# Patient Record
Sex: MALE | Race: WHITE | NOT HISPANIC OR LATINO | Employment: FULL TIME | URBAN - METROPOLITAN AREA
[De-identification: names, ages, dates, MRNs, and addresses within clinical notes are randomized per-mention and may not be internally consistent; named-entity substitution may affect disease eponyms.]

---

## 2023-09-30 ENCOUNTER — DOCUMENTATION (OUTPATIENT)
Dept: ORTHOPEDIC SURGERY | Facility: HOSPITAL | Age: 48
End: 2023-09-30
Payer: COMMERCIAL

## 2023-09-30 DIAGNOSIS — S76.012A HIP STRAIN, LEFT, INITIAL ENCOUNTER: ICD-10-CM

## 2023-09-30 PROBLEM — J20.9 ACUTE BRONCHITIS WITH BRONCHOSPASM: Status: ACTIVE | Noted: 2023-09-30

## 2023-09-30 PROBLEM — Z98.52 STATUS POST VASECTOMY: Status: ACTIVE | Noted: 2023-09-30

## 2023-09-30 PROBLEM — R06.89 DECREASED LUNG SOUNDS: Status: ACTIVE | Noted: 2023-09-30

## 2023-09-30 RX ORDER — MULTIVITAMIN
1 TABLET ORAL DAILY
COMMUNITY
Start: 2021-05-21

## 2023-10-01 NOTE — PROGRESS NOTES
At LOV on 09/29/23 with Dr. Ventura pt was Rx'd PT. Pt is [facility]. Referral entered into Epic as well and sent to Evy Wilkins and Sylvie Oden.     Breonna Stacy LPN

## 2023-10-18 NOTE — PROGRESS NOTES
Pt's MRI was approved by [facility]. Called and LM notifying pt.     Breonna Stacy LPN

## 2023-11-01 ENCOUNTER — EVALUATION (OUTPATIENT)
Dept: PHYSICAL THERAPY | Facility: CLINIC | Age: 48
End: 2023-11-01
Payer: COMMERCIAL

## 2023-11-01 DIAGNOSIS — S76.012S: ICD-10-CM

## 2023-11-01 DIAGNOSIS — M25.559 PAIN IN JOINT, PELVIC REGION AND THIGH: ICD-10-CM

## 2023-11-01 DIAGNOSIS — S76.912S: ICD-10-CM

## 2023-11-01 DIAGNOSIS — M25.552 LEFT HIP PAIN: ICD-10-CM

## 2023-11-01 DIAGNOSIS — S76.012A HIP STRAIN, LEFT, INITIAL ENCOUNTER: Primary | ICD-10-CM

## 2023-11-01 PROCEDURE — 97161 PT EVAL LOW COMPLEX 20 MIN: CPT | Mod: GP

## 2023-11-01 PROCEDURE — 97110 THERAPEUTIC EXERCISES: CPT | Mod: GP

## 2023-11-01 NOTE — PROGRESS NOTES
"Patient Name Omar Paredes  MRN: [id number]  Today's Date: 11/1/2023  Time Calculation  Start Time: 0540  Stop Time: 0620  Time Calculation (min): 40 min    Therapy Diagnoses:   1. Hip strain, left, initial encounter  Referral to Physical Therapy    Follow Up In Physical Therapy      2. Left hip pain        3. Pain in joint, pelvic region and thigh        4. Strain of hip and thigh, left, sequela            General:  Reason for visit: Left hip pain   Referred by: Dr. Lorenzo Pepper MD appt:  11/10/2023  Preferred Name:  Omar  Script:  Evaluate and treat  Onset Date:  8/11/2023    Insurance:   Visit #: 1  Insurance Reviewed  (per information provided by  pre-cert team)   Authorization required:  Y  Approved # of visits:12  Authorization/MCR certification date range:  10/9/2023 to 12/1/2023    Assessment:      Problems:    Pain:  __X_  Posture/Body mechanics deficits:  ___  Decreased knowledge HEP:  _X__  Decreased knowledge of Precautions:  _X__  Activity Limitations:   ___  ADL's/IADL's/Self-care skills:  ___  ROM/Joint Mobility:  _X__  Strength:  __X_  Decreased functional level:   __X_  Flexibility:  _X__  Tenderness/decreased soft tissue mobility:  _X__  Gait/Stairs:  __X_  Fall Risk:  __X_  Balance:  __X_  Edema:  ___  Participation restrictions:  __X_  Sensory:  ___  Transfers:  ___  Decreased knowledge of brace:   ___  Other:  ___    X Indicates included in problem list    Goals:      STG:  In two weeks.   Increased postural awareness.     Compliant with HEP    LTG: by discharge    Increased postural awareness and posture WFL.    Increased function with ADL's and IADL's.  Improve LEFS to:  20 %  limitation of function.    Normal reciprocal pattern on stairs for improved function to all levels of home.      Increase  R SLS to 20\"    Increase L strength to WNL for improved ease of work duties, especially when carrying a ladder    Increase denisa LE flexibility to WFL.      I and compliant with HEP and proper L LE " "care.      Rehab potential to achieve the above goals is good.    Patient is aware of diagnosis and prognosis and agrees with established plan of care and goals.    Plan:   Skilled PT 2 x/week for 6 weeks( Until goals achieved, maximum rehab potential has been achieved, or patient has plateaued)  for:    Aquatics  _____  CP   __X__  Dry needling  ____  Education  __X__  Electrical Stimulation  __X__  Gait training  ____  HEP  __X__  HP  __X__  Manual  __X__  Mechanical Traction  ____  NMR  __X__  Self-care/home management  __X__  Therapeutic Exercise   __X__  Therapeutic Activities  __X__  US  __X__  Work Conditioning  ____  Re-assessment  ____  Other  ____    X Indicates included in treatment plan    PT for Nu-step for functional mobility and soft tissue warm up for more efficient stretching, work on     Subjective:    HPI: Pt works as a  at the [employer] main campus.  He was carrying a ladder towards an area of potential flooding when he slipped on a floor where someone had mopped without a wet floor sign.  He describes his left left sliding forward and all his weight shifted to his left as he grabbed the door frame.    Pain  Left lateral thigh, wraps around to the buttock, and to the anterior thigh 5-8 depending on activity  Type of pain:  Sharp/Nerve pinch intermittent; \"Constant pain tightness\"  What increases pain: Working on ladder at work, sudden shift of his body weight, standing up from a low position, hip external and internal rotation  What decreases pain: Biofreeze, Advil, ice, wife massages the left hip    Medical Hx unremarkable  Precautions: none       Adult Screening Risk:      Fall Risk:  no. Even though patient fell at work, it was due to slipping on a wet surface. Otherwise, he is not at risk for falling    There are no spiritual/cultural practices/values/needs that are important to know     Initial Fall Risk Screening:   Patient has not fallen in the last 6 months. Patient does not " have a fear of falling. They do not need assistance with sitting, standing or walking. Does not need assistance walking in her home. They do not need assistance in an unfamiliar setting. The patient is not using an assistive device.     Domestic Violence Screen: Does not feel threatened or abused physically, emotionally or sexually. Do you feel UNSAFE?   The patient feels safe in the home.     Depression/Suicide Screening:   During the past 2 weeks, the patient has not felt down, depressed or hopeless.    During the past 2 weeks, the patient has not felt little interest or pleasure in doing things.    They do not have a risk of suicide.       Human Trafficking risk:  No    Key Learner:  Self  Preferred Learning:  Printed Materials/Demonstration/Discussion  Learning Barriers:  no    Patient goal:  Alleviate pain  Patient is aware of diagnosis and prognosis.    Living Environment:    Lives with:  spouse    Prior Function:  Independent in all aspects of his life    Function:    A and O x 3  Work: Continues to work with difficulty depending on the activity  Recreation: Likes to exercise  Sitting: not affected Standing: difficulty transition from a low squat or kneel to standing   Walking:   Hurts more when carrying ladder    Objective:    Outcome Measures:  Other Measures  Lower Extremity Funtional Score (LEFS): 50/80     Posture:  decreased WB LE,  LE ER at hip.      Gait/stairs:  decreased stance time, decreased hip ext and trunk rotation, LE ER throughout cycle and step to pattern on stairs with B UE support with LE WB.      Balance:   L SLS:  >15 sec    ROM:    A/PROM with pain in left IR/ER, otherwise WFL  MMT:   Hip flexors: Right 5/5; Left 4/5  ext: Sky 4+/5  abd: Right 5/5; Left 3+/5  add: Right 5/5; Left 4-/5  Quads: Bilaterally 5/5  Hams: Bilaterally 5/5    Flexibility:    Hams:  SLR: Right 50, Left 40:  Hip flexors:  past neutral    Palpation:  NT  Treatment:    Evaluation:  25 minutes    **= HEP  NV= Next  visit  np= not performed  nb= non-billable  G= group  HEP= discharged to HEP.     Therapeutic Exercise:  15  Access Code: [email or web address]  URL: [email or web address]  Date: 11/02/2023  Prepared by: Cari Downing    Exercises  - Supine Hip Adduction Isometric with Ball  - 1-2 x daily - 7 x weekly - 1-2 sets - 10 reps - 5-10 count hold  - Hooklying Clamshell with Resistance  - 1-2 x daily - 7 x weekly - 2-3 sets - 10 reps  - Supine Bridge  - 1-2 x daily - 7 x weekly - 1-2 sets - 10 reps - 3-5 seconds hold  - Hooklying Isometric Hip Flexion  - 1-2 x daily - 7 x weekly - 1-2 sets - 10 reps - 5 count hold  - Supine Hamstring Stretch with Strap  - 1-2 x daily - 7 x weekly - 1 sets - 5 reps - 20 seconds hold  - Prone Hip Extension  - 1-2 x daily - 7 x weekly - 1-2 sets - 10 reps - 3-5 seconds hold    Education:  poc, anatomy, physiology, posture, body mechanics, safety awareness, HEP.  Preferred learning:  pictures, demonstration.  Demonstrated good understanding.

## 2023-11-01 NOTE — Clinical Note
November 2, 2023     Patient: Omar Paredes   YOB: 1975   Date of Visit: 11/1/2023       To Whom It May Concern:    It is my medical opinion that Omar Paredes {Work release (duty restriction):34384}.    If you have any questions or concerns, please don't hesitate to call.         Sincerely,        Cari Downing, PT    CC: No Recipients

## 2023-11-01 NOTE — Clinical Note
November 2, 2023     Patient: Omar Paredes   YOB: 1975   Date of Visit: 11/1/2023       To Whom it May Concern:    Omar Paredes was seen in my clinic on 11/1/2023. He {Return to school/sport:55362}.    If you have any questions or concerns, please don't hesitate to call.         Sincerely,          Cari Downing, PT        CC: No Recipients

## 2023-11-07 ENCOUNTER — TELEPHONE (OUTPATIENT)
Dept: ORTHOPEDIC SURGERY | Facility: HOSPITAL | Age: 48
End: 2023-11-07
Payer: COMMERCIAL

## 2023-11-08 ENCOUNTER — APPOINTMENT (OUTPATIENT)
Dept: PHYSICAL THERAPY | Facility: CLINIC | Age: 48
End: 2023-11-08
Payer: COMMERCIAL

## 2023-11-08 ENCOUNTER — HOSPITAL ENCOUNTER (OUTPATIENT)
Dept: RADIOLOGY | Facility: HOSPITAL | Age: 48
Discharge: HOME | End: 2023-11-08
Payer: COMMERCIAL

## 2023-11-08 DIAGNOSIS — M25.559 PAIN IN UNSPECIFIED HIP: ICD-10-CM

## 2023-11-08 DIAGNOSIS — S76.012A STRAIN OF MUSCLE, FASCIA AND TENDON OF LEFT HIP, INITIAL ENCOUNTER: ICD-10-CM

## 2023-11-08 PROCEDURE — 73718 MRI LOWER EXTREMITY W/O DYE: CPT | Mod: LT

## 2023-11-08 PROCEDURE — 73718 MRI LOWER EXTREMITY W/O DYE: CPT | Mod: LEFT SIDE | Performed by: RADIOLOGY

## 2023-11-08 NOTE — TELEPHONE ENCOUNTER
Reached out to Evy Wilkins and Sylvie Oden in  [facility] department and there was an approval scanned in this AM for pt's MRI.     Called and LM on [phone] (home)  for pt to call back.     Breonna Stacy LPN

## 2023-11-08 NOTE — TELEPHONE ENCOUNTER
Pt called to check on status of MRI approval through [facility]. Pt's MRI was denied through [facility] d/t cyst being visible on x-ray. Should I still notify the pt to continue with his MRI and go through his insurance?     Breonna Stacy LPN

## 2023-11-10 ENCOUNTER — PHARMACY VISIT (OUTPATIENT)
Dept: PHARMACY | Facility: CLINIC | Age: 48
End: 2023-11-10
Payer: COMMERCIAL

## 2023-11-10 ENCOUNTER — OFFICE VISIT (OUTPATIENT)
Dept: ORTHOPEDIC SURGERY | Facility: HOSPITAL | Age: 48
End: 2023-11-10
Payer: COMMERCIAL

## 2023-11-10 VITALS — WEIGHT: 182 LBS | HEIGHT: 67 IN | BODY MASS INDEX: 28.56 KG/M2

## 2023-11-10 DIAGNOSIS — M13.852 OTHER SPECIFIED ARTHRITIS, LEFT HIP: ICD-10-CM
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
## 2023-11-10 DIAGNOSIS — S76.012A HIP STRAIN, LEFT, INITIAL ENCOUNTER: Primary | ICD-10-CM

## 2023-11-10 PROBLEM — S60.426A: Status: RESOLVED | Noted: 2023-09-23 | Resolved: 2023-11-10

## 2023-11-10 PROBLEM — R06.89 DECREASED LUNG SOUNDS: Status: RESOLVED | Noted: 2023-09-30 | Resolved: 2023-11-10

## 2023-11-10 PROBLEM — J20.9 ACUTE BRONCHITIS WITH BRONCHOSPASM: Status: RESOLVED | Noted: 2023-09-30 | Resolved: 2023-11-10

## 2023-11-10 PROCEDURE — 99214 OFFICE O/P EST MOD 30 MIN: CPT | Performed by: ORTHOPAEDIC SURGERY

## 2023-11-10 PROCEDURE — RXMED WILLOW AMBULATORY MEDICATION CHARGE

## 2023-11-10 RX ORDER — MELOXICAM 15 MG/1
15 TABLET ORAL DAILY PRN
Qty: 30 TABLET | Refills: 0 | Status: SHIPPED | OUTPATIENT
Start: 2023-11-10 | End: 2023-12-10

## 2023-11-10 NOTE — PROGRESS NOTES
47-year-old male presenting for follow-up regarding his left hip pain after work-related incident.  He had a possibly degenerative cyst on his x-ray so an MRI was ordered to evaluate and also intra-articular pathologies.  His symptoms are essentially the same.  Has been taking Advil off and on with some minimal relief when he does take it.  He has been doing physical therapy as well.    The patient does not endorse fevers and chills. ~He/She~ does not endorse any change in her vision or hearing. ~He/She~ does not endorse chest pain, shortness of breath. ~He/She~ does not endorse any abdominal discomfort. ~He/She~ does not endorse any skin irritation or lesions. ~He/She~ does not endorse any new numbness and tingling or as otherwise stated in the history of present illness.    ~He/She~ is in no acute distress, alert and oriented x 3.    Mood and affect are appropriate.    Respirations are unlabored.    Distal limb is pink and well perfused.    Left lower extremity evaluation demonstrates he internally rotates to about neutral to 5 degrees only.  He has a positive impingement sign.  He has a mildly antalgic gait on the left.    MRI was personally reviewed and demonstrates moderate degenerative changes to the left hip and some trochanteric bursitis with abductor tendinosis.    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  He likely had a quiescent arthritic changes radiographically prior to his accident and now is having symptoms related to it.  I would like to send him for an intra-articular cortisone injection and prescribed him oral meloxicam to treat this and hopefully get him back to a stable state.  We will continue to try conservative measures and avoid surgery at this time.  He can follow-up as needed.    Natural History reviewed. All questions answered. ~He/She~ was in agreement with the plan.      **This note was created using voice recognition software and was not  corrected for typographical or grammatical errors.**

## 2023-11-14 ENCOUNTER — TREATMENT (OUTPATIENT)
Dept: PHYSICAL THERAPY | Facility: CLINIC | Age: 48
End: 2023-11-14
Payer: COMMERCIAL

## 2023-11-14 DIAGNOSIS — S76.012A HIP STRAIN, LEFT, INITIAL ENCOUNTER: Primary | ICD-10-CM

## 2023-11-14 DIAGNOSIS — S76.912S: ICD-10-CM

## 2023-11-14 DIAGNOSIS — M25.552 LEFT HIP PAIN: ICD-10-CM

## 2023-11-14 DIAGNOSIS — S76.012S: ICD-10-CM

## 2023-11-14 DIAGNOSIS — M25.559 PAIN IN JOINT, PELVIC REGION AND THIGH: ICD-10-CM

## 2023-11-14 PROCEDURE — 97110 THERAPEUTIC EXERCISES: CPT | Mod: GP,CQ

## 2023-11-14 PROCEDURE — 97035 APP MDLTY 1+ULTRASOUND EA 15: CPT | Mod: GP,CQ

## 2023-11-14 NOTE — PROGRESS NOTES
Physical Therapy  Physical Therapy Progress Note    Patient Name Omar Paredes   MRN: [id number]  Today's Date: 11/14/23  Time Calculation  Start Time: 0400  Stop Time: 0445  Time Calculation (min): 45 min    Insurance:    Visit #: 2  Insurance Reviewed  (per information provided by  pre-cert team)   Authorization required:  Y  Approved # of visits:12  Authorization/MCR certification date range:  10/9/2023 to 12/1/2023     Therapy Diagnoses:   1. Hip strain, left, initial encounter        2. Left hip pain        3. Pain in joint, pelvic region and thigh        4. Strain of hip and thigh, left, sequela            General:  General:  Reason for visit: Left hip pain   Referred by: Dr. Lorenzo Pepper MD appt:  11/10/2023  Preferred Name:  Omar  Script:  Evaluate and treat  Onset Date:  8/11/2023  Assessment:  Patient tolerated treatment: well. Addressed pain with modalties adding ultra sound and manual. Patient had good tolerance.  Patient needs continued work on left hip stability /skilled PT for: progression in closed chain to address remaining functional, objective and subjective deficits to allow them to return to prior /optimal level of function with ADLs.  Patient is progressing with goals: yes  Skilled care:  manual  Plan:    Continue with poc as documented in the legacy system.     Continue to progress per poc:   NV add dips    Subjective:   Patient reports:  states the hip is tight in the AM improves with motion    Have you fallen since last visit:  no    Precautions: none    Pain:  5/10  Location/Type of pain:  greater trochanter, ache    HEP compliance/understanding:  yes    Objective:   Objective Measurements:    Function:  makes revolution on bike with light vernon climb and did well   :  Balance: light hand support for SL balance    Strength:fatigued easily with light band and side walks       Treatment:   **= HEP  NV= Next visit  np= not performed  nb= non-billable  G= group HEP= discharged to  HEP      Therapeutic Exercise:     30 minutes  [employer] bike up right  8 min   Step stretches ham/hip flexion and 20sec 2x  SL multi hip 15x light hand support  Side walks yellow 4 sets  Monster walks yellow 3 sets  Manual Therapy:     5 minutes  Side line manual with smart to left hip    Modalities:       Ultrasound   1MHz at 50% 1.5wcm/2 to left hip 10 min      Education:  ex progression in closed chain  HEP Progression:  issued yellow/green and black band ,side walks/multi hip

## 2023-11-15 ENCOUNTER — HOSPITAL ENCOUNTER (OUTPATIENT)
Dept: RADIOLOGY | Facility: HOSPITAL | Age: 48
Discharge: HOME | End: 2023-11-15
Payer: COMMERCIAL

## 2023-11-15 ENCOUNTER — OFFICE VISIT (OUTPATIENT)
Dept: ORTHOPEDIC SURGERY | Facility: HOSPITAL | Age: 48
End: 2023-11-15
Payer: COMMERCIAL

## 2023-11-15 DIAGNOSIS — M25.849 MASS OF JOINT OF FINGER: Primary | ICD-10-CM

## 2023-11-15 DIAGNOSIS — M25.849 MASS OF JOINT OF FINGER: ICD-10-CM

## 2023-11-15 PROCEDURE — 2500000005 HC RX 250 GENERAL PHARMACY W/O HCPCS: Performed by: STUDENT IN AN ORGANIZED HEALTH CARE EDUCATION/TRAINING PROGRAM

## 2023-11-15 PROCEDURE — 73140 X-RAY EXAM OF FINGER(S): CPT | Mod: RT,FY

## 2023-11-15 PROCEDURE — 73140 X-RAY EXAM OF FINGER(S): CPT | Mod: RIGHT SIDE | Performed by: RADIOLOGY

## 2023-11-15 PROCEDURE — 2500000004 HC RX 250 GENERAL PHARMACY W/ HCPCS (ALT 636 FOR OP/ED): Performed by: STUDENT IN AN ORGANIZED HEALTH CARE EDUCATION/TRAINING PROGRAM

## 2023-11-15 PROCEDURE — 99214 OFFICE O/P EST MOD 30 MIN: CPT | Performed by: STUDENT IN AN ORGANIZED HEALTH CARE EDUCATION/TRAINING PROGRAM

## 2023-11-15 PROCEDURE — 20600 DRAIN/INJ JOINT/BURSA W/O US: CPT | Performed by: STUDENT IN AN ORGANIZED HEALTH CARE EDUCATION/TRAINING PROGRAM

## 2023-11-15 PROCEDURE — 73140 X-RAY EXAM OF FINGER(S): CPT | Mod: RT

## 2023-11-15 PROCEDURE — 99214 OFFICE O/P EST MOD 30 MIN: CPT | Mod: 25 | Performed by: STUDENT IN AN ORGANIZED HEALTH CARE EDUCATION/TRAINING PROGRAM

## 2023-11-15 RX ORDER — BETAMETHASONE SODIUM PHOSPHATE AND BETAMETHASONE ACETATE 3; 3 MG/ML; MG/ML
3 INJECTION, SUSPENSION INTRA-ARTICULAR; INTRALESIONAL; INTRAMUSCULAR; SOFT TISSUE
Status: COMPLETED | OUTPATIENT
Start: 2023-11-15 | End: 2023-11-15

## 2023-11-15 RX ORDER — LIDOCAINE HYDROCHLORIDE 10 MG/ML
0.5 INJECTION INFILTRATION; PERINEURAL
Status: COMPLETED | OUTPATIENT
Start: 2023-11-15 | End: 2023-11-15

## 2023-11-15 RX ADMIN — BETAMETHASONE SODIUM PHOSPHATE AND BETAMETHASONE ACETATE 3 MG: 3; 3 INJECTION, SUSPENSION INTRA-ARTICULAR; INTRALESIONAL; INTRAMUSCULAR at 11:05

## 2023-11-15 RX ADMIN — LIDOCAINE HYDROCHLORIDE 0.5 ML: 10 INJECTION, SOLUTION INFILTRATION; PERINEURAL at 11:05

## 2023-11-15 ASSESSMENT — PAIN - FUNCTIONAL ASSESSMENT: PAIN_FUNCTIONAL_ASSESSMENT: 0-10

## 2023-11-15 ASSESSMENT — PAIN SCALES - GENERAL: PAINLEVEL_OUTOF10: 0 - NO PAIN

## 2023-11-15 NOTE — PROGRESS NOTES
History of Present Illness:  The patient presents today for for mass on his right small finger.  He has a mass over his PIP joint dorsally.  He states that he noticed this in August.  He then had it aspirated at the urgent care in September and one of the things that.  He states it was gelatinous.  The mass is returned.  He states that the mass is currently about half the size as it was when prior.  Pain is mild.  He denies numbness and tingling.      He is right-hand dominant and works as a  for [employer].  Past Medical History:   Diagnosis Date    Blister (nonthermal) of right little finger, initial encounter 09/23/2023    Other specified health status     No pertinent past medical history       Medication Documentation Review Audit       Reviewed by Ml Sims MA (Medical Assistant) on 11/15/23 at 0904      Medication Order Taking? Sig Documenting Provider Last Dose Status   meloxicam (Mobic) 15 mg tablet [id number]  Take 1 tablet (15 mg) by mouth once daily as needed for moderate pain (4 - 6). Cedrick Ventura MD  Active   methylPREDNISolone (Medrol Dospak) 4 mg tablets [id number]  TAKE AS DIRECTED PER PACKAGE INSTRUCTIONS Cedrick Ventura MD  Active   multivitamin tablet [id number]  Take 1 tablet by mouth once daily. Historical Provider, MD  Active   orphenadrine (Norflex) 100 mg 12 hr tablet [id number]  TAKE 1 TABLET BY MOUTH TWO TIMES A DAY Candice Taylor PA-C  Active                    Allergies   Allergen Reactions    Codeine Sulfate Unknown       Social History     Socioeconomic History    Marital status: Single     Spouse name: Not on file    Number of children: Not on file    Years of education: Not on file    Highest education level: Not on file   Occupational History    Not on file   Tobacco Use    Smoking status: Unknown    Smokeless tobacco: Not on file   Substance and Sexual Activity    Alcohol use: Not Currently    Drug use: Not Currently    Sexual activity: Not on file    Other Topics Concern    Not on file   Social History Narrative    Not on file     Social Determinants of Health     Financial Resource Strain: Not on file   Food Insecurity: Not on file   Transportation Needs: Not on file   Physical Activity: Not on file   Stress: Not on file   Social Connections: Not on file   Intimate Partner Violence: Not on file   Housing Stability: Not on file       Past Surgical History:   Procedure Laterality Date    OTHER SURGICAL HISTORY  04/12/2021    Cholecystectomy    OTHER SURGICAL HISTORY  04/12/2021    Foot surgery          Review of Systems   GENERAL: Negative for malaise, significant weight loss, fever  MUSCULOSKELETAL: see HPI  NEURO:  Negative     Physical Examination:  side: right wrist/hand: small finger  There is a dorsal soft tissue mass over the PIP of the small finger.  This is approximately 1cm by 5 mm  Non tender to palpation  Pt can make a full composite fist to distal terrazas crease  Pt full fully extend MP and IP joints.  Normal sensation to light touch   Motor exam within normal limits  Radial pulse palpable  Good capillary refill      Additional studies: XR right little finger  3 views of the left small finger taken and reviewed in office today.  PA, Lateral, And Oblique demonstrating no fracture or dislocations. There is mild arthritis of the PIP joint.  There is a large dorsal osteophyte present     Assessment:  Dorsal soft tissue mass over PIP joint of small finger     Plan:  Patient has a dorsal soft tissue mass over the dorsum of his PIP joint of the little finger.  We discussed treatment options which include: Observation, aspiration, and surgical excision.  Patient had a previous aspiration however this mass has returned.  We discussed another attempt at aspiration and injection.  Patient elected to proceed with this.  We discussed that if it worsens and becomes more bothersome that we could remove it.  This is likely a ganglion cyst.  However, this could be a  giant cell tumor.    An aspiration was not performed however no aspirate was obtained.    S Inj/Asp: R small PIP on 11/15/2023 11:05 AM  Indications: joint swelling, diagnostic evaluation and pain  Details: 22 G needle, dorsal approach  Medications: 3 mg betamethasone acet,sod phos 6 mg/mL; 0.5 mL lidocaine 10 mg/mL (1 %)  Aspirate: 0 mL  Outcome: tolerated well, no immediate complications  Consent was given by the patient. Immediately prior to procedure a time out was called to verify the correct patient, procedure, equipment, support staff and site/side marked as required. Patient was prepped and draped in the usual sterile fashion.

## 2023-11-16 ENCOUNTER — TREATMENT (OUTPATIENT)
Dept: PHYSICAL THERAPY | Facility: CLINIC | Age: 48
End: 2023-11-16
Payer: COMMERCIAL

## 2023-11-16 DIAGNOSIS — S76.012S: ICD-10-CM

## 2023-11-16 DIAGNOSIS — S76.012A HIP STRAIN, LEFT, INITIAL ENCOUNTER: ICD-10-CM

## 2023-11-16 DIAGNOSIS — S76.912S: ICD-10-CM

## 2023-11-16 DIAGNOSIS — M25.552 LEFT HIP PAIN: Primary | ICD-10-CM

## 2023-11-16 DIAGNOSIS — M25.559 PAIN IN JOINT, PELVIC REGION AND THIGH: ICD-10-CM

## 2023-11-16 PROCEDURE — 97035 APP MDLTY 1+ULTRASOUND EA 15: CPT | Mod: GP,CQ

## 2023-11-16 PROCEDURE — 97140 MANUAL THERAPY 1/> REGIONS: CPT | Mod: GP,CQ

## 2023-11-16 PROCEDURE — 97110 THERAPEUTIC EXERCISES: CPT | Mod: GP,CQ

## 2023-11-16 NOTE — PROGRESS NOTES
Physical Therapy  Physical Therapy Progress Note    Patient Name Omar Paredes   MRN: [id number]  Today's Date: 11/16/23  Time Calculation  Start Time: 0530  Stop Time: 0615  Time Calculation (min): 45 min    Insurance:    Visit #: 3  Insurance Reviewed  (per information provided by  pre-cert team)   Authorization required:  Y  Approved # of visits:12  Authorization/MCR certification date range:  10/9/2023 to 12/1/2023  Therapy Diagnoses:   1. Left hip pain        2. Hip strain, left, initial encounter  Follow Up In Physical Therapy      3. Pain in joint, pelvic region and thigh        4. Strain of hip and thigh, left, sequela            General:  Reason for visit: Left hip pain   Referred by: Dr. Lorenzo Pepper MD appt:  11/10/2023  Preferred Name:  Omar  Script:  Evaluate and treat  Onset Date:  8/11/2023  Assessment:  Patient tolerated treatment: well with the added strengthening. Reporting some relief with manual. Patient needs continued work on hip stability an ROM/skilled PT for: progression in hip stability and pain relief to address remaining functional, objective and subjective deficits to allow them to return to prior /optimal level of function with ADLs.  Patient is progressing with goals: yes  Skilled care:   manual , ultrasound , ex    Plan:    Continue with poc as documented in the legacy system.     Continue to progress per poc:   NV add dips    Subjective:   Patient reports:  stated the ultrasound and manual was help full.    Have you fallen since last visit:  no    Precautions: none    Pain:  4/10  Location/Type of pain:  greater trochanter    HEP compliance/understanding:  yes    Objective:   Objective Measurements:      Balance: progressed with airex balance with light finger support    Strength: fatigues easily on left limb       Treatment:   **= HEP  NV= Next visit  np= not performed  nb= non-billable  G= group HEP= discharged to [facility]      Therapeutic Exercise:     20 minutes  [employer] bike up  right  8 min   Equipment  Leg press  10# 12x3  Hip abd add 30/20## 15x2    Step stretches ham/hip flexion and 20sec 2x  Airex SL multi hip 15x light hand support  Dips 6'' 10x2  nv  Min split squat 12x2  Side walks yellow 4 sets  hep  Monster walks yellow 3 sets  hep    Manual Therapy:     15 minutes    Side line manual with smart  tool and roller to left hip         modalities:       Ultrasound   1MHz at 50% 1.5wcm/2 to left hip 10 min       Education:  ex progression  HEP Progression:  progression in equipment,

## 2023-11-20 ENCOUNTER — APPOINTMENT (OUTPATIENT)
Dept: ORTHOPEDIC SURGERY | Facility: HOSPITAL | Age: 48
End: 2023-11-20
Payer: COMMERCIAL

## 2023-11-20 ENCOUNTER — TREATMENT (OUTPATIENT)
Dept: PHYSICAL THERAPY | Facility: CLINIC | Age: 48
End: 2023-11-20
Payer: COMMERCIAL

## 2023-11-20 DIAGNOSIS — S76.012S: ICD-10-CM

## 2023-11-20 DIAGNOSIS — S76.912S: ICD-10-CM

## 2023-11-20 DIAGNOSIS — S76.012A HIP STRAIN, LEFT, INITIAL ENCOUNTER: Primary | ICD-10-CM

## 2023-11-20 PROCEDURE — 97110 THERAPEUTIC EXERCISES: CPT | Mod: GP

## 2023-11-20 PROCEDURE — 97140 MANUAL THERAPY 1/> REGIONS: CPT | Mod: GP

## 2023-11-20 NOTE — PROGRESS NOTES
Physical Therapy  Physical Therapy Progress Note    Patient Name Omar Paredes   MRN: [id number]  Today's Date: 11/20/23  Time Calculation  Start Time: 0438  Stop Time: 0525  Time Calculation (min): 47 min    Insurance:    Visit #: 4  Insurance Reviewed  (per information provided by  pre-cert team)   Authorization required:  Y  Approved # of visits:12  Authorization/MCR certification date range:  10/9/2023 to 12/1/2023    Therapy Diagnoses:   1. Hip strain, left, initial encounter  Follow Up In Physical Therapy    Follow Up In Physical Therapy      2. Strain of hip and thigh, left, sequela          General:  Reason for visit: Left hip pain   Referred by: Dr. Lorenzo Pepper MD appt:  11/10/2023  Preferred Name:  Omar  Script:  Evaluate and treat  Onset Date:  8/11/2023    Assessment:  Patient needs continued work on/skilled PT for: pain management, increased left hip strength and mobility to address remaining functional, objective and subjective deficits to allow them to return to optimal and safe level of function with ADLs and job as  at main [employer]  Patient is progressing with goals: yes  Skilled care:  Therapeutic ex, manual treatment    Plan:    Continue to progress per poc:   NV add additional CKC ex for hip stability    Subjective:   Patient reports:  he has been feeling like his symptoms are decreasing until last Saturday when he had to climb ladders for work and he started having increased pain again.    Have you fallen since last visit:  no    Precautions: no fall risk    Pain:  3-4/10  Location/Type of pain:  Left lateral greater trochanter    HEP compliance/understanding:  yes    Objective:   Objective Measurements:    Function:  climbing high ladders but with pain   Seated AROM left hip IR 10 deg and ER to 35  Flexibility:  left hip flexor to approx. 5 deg hip extension    Treatment:   **= HEP  NV= Next visit  np= not performed  nb= non-billable  G= group HEP= discharged to  "HEP      Therapeutic Exercise:     30 Minutes    [employer] bike up right  8 min while subjective taken  Leg press  55# 2 x 10    Step stretches ham/hip flexion and 20sec 2x  Airex SL multi hip 15x light hand support NP  Airex Tandem stance 1' with right and left foot leading  Heel taps 4'' 10x2    Lateral step overs, 4\" step, 20x  Min split squat 15x1    Side walks yellow 4 sets  hep  Monster walks yellow 3 sets  hep    Manual Therapy:     17 minutes (Combined treatment of manual and US)  Hip flexor, ITB, and lateral hip stretching for  9 minutes    Ultrasound          8  Minutes   1MHz at 50% 1.5wcm/2 to left hip 10 min    Education:  Clarified use of heat vs cold with patient      "

## 2023-11-21 ENCOUNTER — TELEPHONE (OUTPATIENT)
Dept: ORTHOPEDIC SURGERY | Facility: CLINIC | Age: 48
End: 2023-11-21

## 2023-11-21 NOTE — TELEPHONE ENCOUNTER
Received call from pt today, he is a [facility] pt. He called to ask if there was someone for Hip injections at [facility] [place] or closer to his house on the [address]. Gave him options of Dr. Hermelindo Jamies, Dr. Cross and that group who is located on that side or even Dr. Cohen, also suggested an injection through [facility] due to him working at [employer]. Pt would like to go forward with getting an injection through [facility] if they are [facility] capable. Sent email to Dr. Browne in [facility] to see if the radiologist are capable of doing a [facility] cortisone injection under fluoro.     Awaiting response    Breonna Stacy LPN

## 2023-11-22 ENCOUNTER — TREATMENT (OUTPATIENT)
Dept: PHYSICAL THERAPY | Facility: CLINIC | Age: 48
End: 2023-11-22
Payer: COMMERCIAL

## 2023-11-22 DIAGNOSIS — M25.552 LEFT HIP PAIN: ICD-10-CM

## 2023-11-22 DIAGNOSIS — S76.912S: ICD-10-CM

## 2023-11-22 DIAGNOSIS — M25.559 PAIN IN JOINT, PELVIC REGION AND THIGH: ICD-10-CM

## 2023-11-22 DIAGNOSIS — S76.012A HIP STRAIN, LEFT, INITIAL ENCOUNTER: Primary | ICD-10-CM

## 2023-11-22 DIAGNOSIS — S76.012S: ICD-10-CM

## 2023-11-22 PROCEDURE — 97110 THERAPEUTIC EXERCISES: CPT | Mod: GP

## 2023-11-22 NOTE — PROGRESS NOTES
"   Physical Therapy  Physical Therapy Progress Note    Patient Name Omar Paredes   MRN: [id number]  Today's Date:11/22/2023  Time Calculation  Start Time: 0335  Stop Time: 0413  Time Calculation (min): 38 min    Insurance:    Visit #: 5  Insurance Reviewed  (per information provided by  pre-cert team)   Authorization required:  Y  Approved # of visits:12  Authorization/MCR certification date range:  10/9/2023 to 12/1/2023    Therapy Diagnoses:   1. Hip strain, left, initial encounter  Follow Up In Physical Therapy      2. Strain of hip and thigh, left, sequela        3. Left hip pain        4. Pain in joint, pelvic region and thigh          General:  Reason for visit: Left hip pain   Referred by: Dr. Lorenzo Pepper MD appt:  11/10/2023  Preferred Name:  Omar  Script:  Evaluate and treat  Onset Date:  8/11/2023    Assessment:  Patient completed exercises in CKC fashion, without exacerbating soreness that he came in with. He was able to progress with ex such as single leg standing with opposite leg squeezing ball to wall,   Patient needs continued work on strengthening in a controlled environment and will be a good candidate to start trial of aquatics next visit.  Patient is progressing with goals: yes  Skilled care: supervised therapeutic ex, electrical stimulation    Plan:    Continue to progress per poc:   NV add aquatics    Subjective:   Patient reports after last therapy session, patient went home and bent over to  his dog's toy and felt a sharp pain to the left hip    Have you fallen since last visit:  no    Precautions: no fall risk    Pain:  5/10  Location/Type of pain:  left lateral hip \"agitated\".     HEP compliance/understanding:  yes    Objective:   Objective Measurements:    Function:  Pt continues to work as a  attempting to avoid ladder work, but not always able to.  Posture: Pt manages split squats to approx 90 deg with good hip and knee alignment.   Palpable tenderness to surrounding " "left greater trochanter   Treatment  **= HEP  NV= Next visit  np= not performed  nb= non-billable  G= group HEP= discharged to [facility]      Therapeutic Exercise:     23  minutes  NuStep, L4 6' Subjective taken  Leg press  55# 2 x 10 NP    Step stretches ham/hip flexion and 20sec 2x  Airex SL multi hip 15x light hand support   SLS with opposite leg ball squeeze into wall for isometric add, 5\" , 10 x, R/L  Heel taps 4'' 10x2    Forward and lateral step overs, 6\" step, 20x  Min split squat 15x1 alternating legs  modalities:     15 min  IFC x 15 min, 9 CV to left greater trochanter region laterally      "

## 2023-11-28 ENCOUNTER — TREATMENT (OUTPATIENT)
Dept: PHYSICAL THERAPY | Facility: CLINIC | Age: 48
End: 2023-11-28
Payer: COMMERCIAL

## 2023-11-28 DIAGNOSIS — M25.559 PAIN IN JOINT, PELVIC REGION AND THIGH: ICD-10-CM

## 2023-11-28 DIAGNOSIS — S76.012S: Primary | ICD-10-CM

## 2023-11-28 DIAGNOSIS — M25.552 LEFT HIP PAIN: ICD-10-CM

## 2023-11-28 DIAGNOSIS — S76.012A HIP STRAIN, LEFT, INITIAL ENCOUNTER: ICD-10-CM

## 2023-11-28 DIAGNOSIS — S76.912S: Primary | ICD-10-CM

## 2023-11-28 PROCEDURE — 97113 AQUATIC THERAPY/EXERCISES: CPT | Mod: GP,CQ

## 2023-11-28 NOTE — PROGRESS NOTES
Physical Therapy  Physical Therapy Progress Note    Patient Name Omar Paredes   MRN: [id number]  Today's Date: 11/28/23  Time Calculation  Start Time: 0530  Stop Time: 0615  Time Calculation (min): 45 min    Insurance:    Visit #: 6  Insurance Reviewed  (per information provided by  pre-cert team)   Authorization required:  Y  Approved # of visits:12  Authorization/MCR certification date range:  10/9/2023 to 12/1/2023  Therapy Diagnoses:   1. Strain of hip and thigh, left, sequela        2. Hip strain, left, initial encounter  Follow Up In Physical Therapy      3. Left hip pain        4. Pain in joint, pelvic region and thigh            General:  Reason for visit: Left hip pain   Referred by: Dr. Lorenzo Pepper MD appt:  11/10/2023  Preferred Name:  Omar  Script:  Evaluate and treat  Onset Date:  8/11/2023     Assessment:  Patient tolerated treatment: well, reported he felt a greater quad workout with the water ex.  Patient needs continued work  progression with hip mobility and strength/skilled PT for: progression in his water program and  to address remaining functional, objective and subjective deficits to allow them to return to prior /optimal level of function with ADLs.  Patient is progressing with goals: yes  Skilled care:  instruction in water program involving the benefit of water ex and bouncy of water that limits the strain at the hip    Plan:         Continue to progress per poc:   NV add stair stretches    Subjective:   Patient reports:  reports he had several fare ups but it is finally calming down . He takes several breaks sitting at work    Have you fallen since last visit:  no    Precautions: no falls    Pain:  2/10  Location/Type of pain:  left hip    HEP compliance/understanding:  yes    Objective:   Objective Measurements:    Function:   walking longer strides in water and good tolerance with continual walking with no increase pain  :  Balance: performed lower ext ex. Against current and no  wall support           Treatment:    T- speed, TM= treadmill, BTW= back to wall,C= current    Aquatics:          45 minutes  T= 4 TM= 5 min forward  T= 2 TM= backward   3 min   BTW  can can  15x  BTW ham kicks 20x  Lunges with hydro tone  row 15x   Bar bell  hip circles 10x each  Bar bell marching  30x  Bar bell squats 15x2    Deep end blue noodle  Cycle 3 min  Scissor/abd 2 min each    Step ups forward/lateral 15x   Step stretch ham/hip flexion 20sec 2x   nv              Education:  progression with POC  HEP Progression:  n/a

## 2023-11-30 ENCOUNTER — TREATMENT (OUTPATIENT)
Dept: PHYSICAL THERAPY | Facility: CLINIC | Age: 48
End: 2023-11-30
Payer: COMMERCIAL

## 2023-11-30 DIAGNOSIS — M25.559 PAIN IN JOINT, PELVIC REGION AND THIGH: ICD-10-CM

## 2023-11-30 DIAGNOSIS — S76.912S: Primary | ICD-10-CM

## 2023-11-30 DIAGNOSIS — S76.012A HIP STRAIN, LEFT, INITIAL ENCOUNTER: ICD-10-CM

## 2023-11-30 DIAGNOSIS — M25.552 LEFT HIP PAIN: ICD-10-CM

## 2023-11-30 DIAGNOSIS — S76.012S: Primary | ICD-10-CM

## 2023-11-30 PROCEDURE — 97113 AQUATIC THERAPY/EXERCISES: CPT | Mod: GP,CQ

## 2023-11-30 NOTE — PROGRESS NOTES
Physical Therapy  Physical Therapy Progress Note    Patient Name Omar Paredes   MRN: [id number]  Today's Date: 11/30/23  Time Calculation  Start Time: 0445  Stop Time: 0530  Time Calculation (min): 45 min    Insurance:    Visit #: 7  Insurance Reviewed  (per information provided by  pre-cert team)   Authorization required:  Y  Approved # of visits:12  Authorization/MCR certification date range:  10/9/2023 to 12/1/2023  Therapy Diagnoses:   1. Strain of hip and thigh, left, sequela        2. Hip strain, left, initial encounter  Follow Up In Physical Therapy      3. Left hip pain        4. Pain in joint, pelvic region and thigh            General:  Reason for visit: Left hip pain   Referred by: Dr. Lorenzo Pepper MD appt:  11/10/2023  Preferred Name:  Omar  Script:  Evaluate and treat  Onset Date:  8/11/2023  Assessment:  Patient tolerated treatment: very well, progressed with lateral walking and resistance of board .demonstrated good tolerance at higher level of ex.  Patient needs continued work on/skilled PT for: progression I hip stability and core to address remaining functional, objective and subjective deficits to allow them to return to prior /optimal level of function with ADLs.  Patient is progressing with goals: yes  Skilled care:  instruction in posture and ex. Performance     Plan:         Continue to progress per poc:   NV add current with all ex if available, piriformis stretch     Subjective:   Patient reports:  states he has felt better in the past couple day s and did not have  to use the ladder as much    Have you fallen since last visit:  no    Precautions: no falls     Pain:  0/10  Location/Type of pain:  left hip    HEP compliance/understanding:  yes    Objective:   Objective Measurements:    Function:   tolerating work duty's better.    Gait :ambulating with longer strides and no hand support.   Balance: required less hand support with ex.      Strength: able to do step ups on second step with  good control    Treatment:   *    Aquatics:          45 minutes      C= 12 T= 6 TM= 5 min forward  C= 12 =T= 4 TM= backward   3 min   C=12 Walking lunges with bar bell on hands   Side stepping with aqua ciser at hip 60sec x2   Forward lunges with aqua ciser 15x2 each leg  BTW  can can  15x  BTW ham kicks 20x   BTW piriformis stretch 20sec 3x each   nv  Bar duque  hip circles 10x each  Bar bell marching  30x  Bar bell squats 15x2  Bar bell ham curls 15x 2      Deep end blue noodle  Cycle 3 min  Scissor/abd 2 min each     Step ups forward/lateral   second step 15x   Step stretch ham/hip flexion 20sec 2x   nv     T  S        Education:  ex progression of POC  HEP Progression:verbal advise with core strengthening on swiss ball

## 2023-12-04 ENCOUNTER — APPOINTMENT (OUTPATIENT)
Dept: ORTHOPEDIC SURGERY | Facility: HOSPITAL | Age: 48
End: 2023-12-04
Payer: COMMERCIAL

## 2023-12-06 ENCOUNTER — TREATMENT (OUTPATIENT)
Dept: PHYSICAL THERAPY | Facility: CLINIC | Age: 48
End: 2023-12-06
Payer: COMMERCIAL

## 2023-12-06 DIAGNOSIS — S76.012A HIP STRAIN, LEFT, INITIAL ENCOUNTER: ICD-10-CM

## 2023-12-06 PROCEDURE — 97113 AQUATIC THERAPY/EXERCISES: CPT | Mod: GP

## 2023-12-06 NOTE — PROGRESS NOTES
"   Physical Therapy  Physical Therapy Progress Note    Patient Name Omar Paredes   MRN: [id number]  Today's Date: 12/06/23  Time Calculation  Start Time: 0445  Stop Time: 0535  Time Calculation (min): 50 min    Insurance:    Visit #: 8  Insurance Reviewed  (per information provided by  pre-cert team)   Authorization required:  Y  Approved # of visits:12  Authorization/MCR certification date range:  10/9/2023 to 12/1/2023    Therapy Diagnoses:   1. Hip strain, left, initial encounter  Follow Up In Physical Therapy        General:  Reason for visit: Left hip pain   Referred by: Dr. Lorenzo Pepper MD appt:  11/10/2023  Preferred Name:  Omar  Script:  Evaluate and treat  Onset Date:  8/11/2023    Assessment:  Patient tolerated treatment well, did well with progression this date.    Patient needs continued skilled PT for: LE strength and flexibility and core stability to address remaining functional, objective and subjective deficits to allow them to return to prior level of function at work  Patient is progressing with goals: yes: decreased pain, increased ability to do CKC ex in pool  Skilled care:  Supervised aquatics    Plan:      NV add hip rotational movement    Subjective:   Patient reports:  he has noted a big improvement since starting PT in the pool.     Have you fallen since last visit:  no    Precautions: no fall risk    Pain:  2/10 Left hip, \"He knows it's there\"  Location/Type of pain:  dull, left lateral hip    HEP compliance/understanding:  yes    Objective:   Function:   Pt is working without significant difficulty as it is slower. Pt states he has been avoiding elliptical and other weightbearing ex on land which seems to help keep his pain down  Posture: Good posture control in water with dynamic walking ex    Treatment:   **= HEP  NV= Next visit  np= not performed  nb= non-billable  G= group HEP= discharged to [facility]      Aquatics:          50 minutes  C= 12 T= 8 TM= 5 min forward  C= 12 =T= 8 TM= " backward   3 min   C=12 Walking lunges with bar bell on hands 5 laps  Side stepping with aqua ciser at hip 6 laps  Forward lunges with aqua ciser 15x2 each leg  Hydrotone walking, 6 laps  BTW  can can  15x  BTW ham kicks 20x   BTW piriformis stretch 20sec 3x each   nv  Bar duque  hip circles 10x each  Bar bell marching  30x  Bar bell squats 15x2  Bar bell ham curls 15x 2      Deep end blue noodle  Cycle 3 min  Scissor/abd 3min each  Hanging, 5 minutes     Step ups forward/lateral   second step 15x   Step stretch ham/hip flexion 20sec 2x   nv

## 2023-12-06 NOTE — TELEPHONE ENCOUNTER
Forwarding message to [facility] dept to inquire if injections from [facility] could be completed.

## 2023-12-12 ENCOUNTER — TREATMENT (OUTPATIENT)
Dept: PHYSICAL THERAPY | Facility: CLINIC | Age: 48
End: 2023-12-12
Payer: COMMERCIAL

## 2023-12-12 DIAGNOSIS — S76.012A HIP STRAIN, LEFT, INITIAL ENCOUNTER: Primary | ICD-10-CM

## 2023-12-12 PROCEDURE — 97113 AQUATIC THERAPY/EXERCISES: CPT | Mod: GP,CQ

## 2023-12-12 NOTE — PROGRESS NOTES
Physical Therapy  Physical Therapy Progress Note    Patient Name Omar Paredes   MRN: [id number]  Today's Date: 12/12/23  Time Calculation  Start Time: 0440  Stop Time: 0520  Time Calculation (min): 40 min    Insurance:    Visit #: 9  Insurance Reviewed  (per information provided by  pre-cert team)   Authorization required:  Y  Approved # of visits:12  Authorization/MCR certification date range:  10/9/2023 to 12/1/2023    Therapy Diagnoses:   1. Hip strain, left, initial encounter            General:  Reason for visit: Left hip pain   Referred by: Dr. Lorenzo Pepper MD appt:  11/10/2023  Preferred Name:  Omar  Script:  Evaluate and treat  Onset Date:  8/11/2023    Assessment:  Patient tolerated treatment well, did well with progression this date.    Patient needs continued skilled PT for: LE strength and flexibility and core stability to address remaining functional, objective and subjective deficits to allow them to return to prior level of function at work  Patient is progressing with goals: yes: performed aquatic exercise progression with improved hip mobility and stabilization without increase in pain.   Skilled care:  aquatic exercise progression, patient education    Plan:    Continue per POC.  NV progress with lateral and diagonal lunges per tolerance.     Subjective:   Patient reports continued left hip soreness that increases with activity.     Have you fallen since last visit:  no    Precautions: no fall risk    Pain:  3-4/10 Left hip,   Location/Type of pain:  dull, left lateral hip    HEP compliance/understanding:  yes    Objective:   Function:   Pt is working without significant difficulty as it is slower. Pt states he has been avoiding elliptical and other weightbearing ex on land which seems to help keep his pain down  Posture: Good posture control in water with dynamic walking ex    Treatment:   **= HEP  NV= Next visit  np= not performed  nb= non-billable  G= group HEP= discharged to  HEP      Aquatics:          40 minutes  C= 12 T= 8 TM= 5 min forward  C= 12 =T= 8 TM= backward   3 min   C=12 Walking lunges with bar bell on hands 5 laps NV  C=12 Side stepping with closed aqua ciser at hip 6 laps  Forward lunges with aqua ciser 15x2 each leg NV  Hydrotone walking, 6 laps NV  BTW  can can  15x  BTW ham kicks 20x   BTW piriformis stretch 20sec 3x each  NV  C=12 Bar bell  hip circles 10x each  C=12 4 Way marches w/ barbell support x 15 each  C=12 Bar bell squats facing C/away from C x 15 each  C=12 Bar bell ham curls 15x 2   C=12 3 way hip AROM w/ barbell and wall support x 10 each  Yoga tree -> Modified Warrior 3 -> Warrior 2 -> Warrior 1 5 breaths x 1 each denisa    Deep end blue noodle  Cycle 3 min  Scissor/abd 3min each  Hanging, 3 minutes    Step ups forward/lateral   second step 15x nv  Step stretch ham/hip flexion 20sec 2x   nv    Patient Education: aquatic exercise progression, hip stabilization

## 2023-12-14 ENCOUNTER — TREATMENT (OUTPATIENT)
Dept: PHYSICAL THERAPY | Facility: CLINIC | Age: 48
End: 2023-12-14
Payer: COMMERCIAL

## 2023-12-14 DIAGNOSIS — S76.012A HIP STRAIN, LEFT, INITIAL ENCOUNTER: Primary | ICD-10-CM

## 2023-12-14 DIAGNOSIS — S76.912S: ICD-10-CM

## 2023-12-14 DIAGNOSIS — M25.552 LEFT HIP PAIN: ICD-10-CM

## 2023-12-14 DIAGNOSIS — M25.559 PAIN IN JOINT, PELVIC REGION AND THIGH: ICD-10-CM

## 2023-12-14 DIAGNOSIS — S76.012S: ICD-10-CM

## 2023-12-14 PROCEDURE — 97113 AQUATIC THERAPY/EXERCISES: CPT | Mod: GP | Performed by: PHYSICAL THERAPIST

## 2023-12-14 NOTE — PROGRESS NOTES
Physical Therapy  Physical Therapy Progress Note    Patient Name Omar Paredes   MRN: [id number]  Today's Date: 12/14/23  Time Calculation  Start Time: 0900  Stop Time: 0945  Time Calculation (min): 45 min    Insurance:    Visit #: 9  Insurance Reviewed  (per information provided by  pre-cert team)   Authorization required:  Y  Approved # of visits:12  Authorization/MCR certification date range:  10/9/2023 to 12/1/2023    Therapy Diagnoses:   1. Hip strain, left, initial encounter        2. Strain of hip and thigh, left, sequela        3. Left hip pain        4. Pain in joint, pelvic region and thigh          General:  Reason for visit: Left hip pain   Referred by: Dr. Lorenzo Pepper MD appt:  11/10/2023  Preferred Name:  Omar  Script:  Evaluate and treat  Onset Date:  8/11/2023     Assessment:  Patient tolerated treatment well, did well with progression this date.    Patient needs continued work on/skilled PT for: LE flexibility and functional and closed chain strength to address remaining functional, objective and subjective deficits to allow them to return to prior /optimal level of function with ADLs.  Patient is progressing with goals: aware of modifications with HEP during flare up  Skilled care:  aquatic exercise progression    Plan:    Continue to progress per poc:   NV add walking lunges with alternated hydrotone punches.      Subjective:   Patient reports:  that he is feeling a little better since flaring up the L hip with lifting and twisting at work on Sunday.      Have you fallen since last visit:  no    Precautions: no fall risk     Pain:  2-3/10  Location/Type of pain:  medial/lateral L hip tightness    HEP compliance/understanding:  yes with modifications due to pain.     Objective:   Objective Measurements:    Function:   working as a , kneeling and getting up and down from the floor a lot.     Treatment:     Aquatics:          45 minutes  C= 12 T= 11 TM= 5 min forward  C= 12 =T= 9 TM=  backward   5 min   C=12 Walking lunges with bar bell on hands 5 laps/np  C=12 Side stepping with closed aqua ciser at hip 6 laps ea. way  Walking lunges with closed aqua ciser x 3 laps  C= 12 Hydrotone punches with 4 way march:  x 15 ea.  walking, 6 laps NV  BTW  can can  10 x  BTW ham kicks 10 x   BTW piriformis stretch 20sec 3x each  NV  C=12  hip circles cw/ccw  15 x each B hands on hips  C=12 Hydrotone pull down with  squats facing C/away from C x 15 each  C=12 ham curls 15x 2 /np  C=12 3 way hip AROM  x 10 each/np  Yoga tree -> Modified Warrior 3 -> Warrior 2 -> Warrior 1 5 breaths x 1 each denisa/np     Deep end blue noodle  Cycle 3 min  Scissor/abd 3min each    Step ups forward/lateral   second step 15x nv  Step stretch ham/hip flexion 20sec 2x   nv    Education:  aquatic exercise progression

## 2023-12-19 ENCOUNTER — TREATMENT (OUTPATIENT)
Dept: PHYSICAL THERAPY | Facility: CLINIC | Age: 48
End: 2023-12-19
Payer: COMMERCIAL

## 2023-12-19 DIAGNOSIS — S76.012A HIP STRAIN, LEFT, INITIAL ENCOUNTER: Primary | ICD-10-CM

## 2023-12-19 DIAGNOSIS — M25.559 PAIN IN JOINT, PELVIC REGION AND THIGH: ICD-10-CM

## 2023-12-19 DIAGNOSIS — S76.012S: ICD-10-CM

## 2023-12-19 DIAGNOSIS — M25.552 LEFT HIP PAIN: ICD-10-CM

## 2023-12-19 DIAGNOSIS — S76.912S: ICD-10-CM

## 2023-12-19 PROCEDURE — 97113 AQUATIC THERAPY/EXERCISES: CPT | Mod: GP,CQ

## 2023-12-19 NOTE — PROGRESS NOTES
Physical Therapy  Physical Therapy Progress Note    Patient Name Omar Paredes   MRN: [id number]  Today's Date: 12/19/23  Time Calculation  Start Time: 0445  Stop Time: 0530  Time Calculation (min): 45 min    Insurance:    Visit #: 10  Insurance Reviewed  (per information provided by  pre-cert team)   Authorization required:  Y  Approved # of visits:12  Authorization/MCR certification date range:  10/9/2023 to 12/1/2023  Therapy Diagnoses:   1. Hip strain, left, initial encounter        2. Strain of hip and thigh, left, sequela        3. Left hip pain        4. Pain in joint, pelvic region and thigh            General:  Reason for visit: Left hip pain   Referred by: Dr. Lorenzo Pepper MD appt:  11/10/2023  Preferred Name:  Omar  Script:  Evaluate and treat  Onset Date:  8/11/2023  Assessment:  Patient tolerated treatment: well , working at higher level of ex.increased current  and core resistance   Patient needs continued work on hip strength and stability /skilled PT for: progression in higher level of balance and strength and  to address remaining functional, objective and subjective deficits to allow them to return to prior /optimal level of function with ADLs.  Patient is progressing with goals: yes, reported greater mobility after ex.  Skilled care:  ex progression     Plan:         Continue to progress per poc:   Check response to progression    Subjective:   Patient reports:  he over did it with shoveling snow and aggravated the hip. Held off getting another injection to see if water ex. Can continue to help    Have you fallen since last visit:  no    Precautions: no falls    Pain:  5/10  Location/Type of pain:   hip left    HEP compliance/understanding:  yes    Objective:   Objective Measurements:    Function:   greater difficulty getting in and out of car second to stiff ness but not as bad as at first.     ROM:  demonstrated good form with piriformis stretch  Strength: progressed in lunges with aqua  ciser as resistance   Flexibility:  reporting less flexibility after shoveling      Treatment:   **= HEP  NV= Next visit  np= not performed  nb= non-billable  G= group HEP= discharged to [facility]      Therapeutic Exercise:       minutes    1 TM= 5 min forward  C= 15=T= 9 TM= backward   5 min   C=15 Side stepping with closed aqua ciser at hip 6 laps ea. way  Walking lunges with closed aqua ciser x 3 laps  C= 15  march:   with ankle wts (puple)   BTW  can can  10 x  BTW ham kicks 10 x   BTW piriformis stretch 20sec 3x each  NV  C=15  hip circles cw/ccw  15 x each B hands on hips  C=15   squats facing with foam under each foot 15x2   C=15 ham curls 15x 2 /np  C=15 3 way hip AROM  x 10 each bar bell row also   Yoga tree -> Modified Warrior 3 -> Warrior 2 -> Warrior 1 5 breaths x 1 each denisa/np        Deep end blue noodle  Cycle 3 min  Scissor/abd 3min each     Step ups forward/lateral   second step 15x nv  Step stretch ham/hip flexion 20sec 2x   nv  S        Education:  ex progression in correct ex. performance  HEP Progression:  n/a

## 2023-12-21 ENCOUNTER — APPOINTMENT (OUTPATIENT)
Dept: PHYSICAL THERAPY | Facility: CLINIC | Age: 48
End: 2023-12-21
Payer: COMMERCIAL

## 2023-12-28 ENCOUNTER — TREATMENT (OUTPATIENT)
Dept: PHYSICAL THERAPY | Facility: CLINIC | Age: 48
End: 2023-12-28
Payer: COMMERCIAL

## 2023-12-28 ENCOUNTER — APPOINTMENT (OUTPATIENT)
Dept: PHYSICAL THERAPY | Facility: CLINIC | Age: 48
End: 2023-12-28
Payer: COMMERCIAL

## 2023-12-28 DIAGNOSIS — S76.912S: ICD-10-CM

## 2023-12-28 DIAGNOSIS — S76.012A HIP STRAIN, LEFT, INITIAL ENCOUNTER: Primary | ICD-10-CM

## 2023-12-28 DIAGNOSIS — S76.012S: ICD-10-CM

## 2023-12-28 DIAGNOSIS — M25.552 LEFT HIP PAIN: ICD-10-CM

## 2023-12-28 DIAGNOSIS — M25.559 PAIN IN JOINT, PELVIC REGION AND THIGH: ICD-10-CM

## 2023-12-28 PROCEDURE — 97110 THERAPEUTIC EXERCISES: CPT | Mod: GP

## 2023-12-28 PROCEDURE — 97113 AQUATIC THERAPY/EXERCISES: CPT | Mod: GP

## 2023-12-28 NOTE — PROGRESS NOTES
"   Physical Therapy  Physical Therapy Progress Note    Patient Name Omar Paredes   MRN: [id number]  Today's Date: 12/28/23  Time Calculation  Start Time: 0750  Stop Time: 0840  Time Calculation (min): 50 min    Insurance:    Visit #: 12   Insurance Reviewed  (per information provided by  pre-cert team)   Authorization required:  Y  Approved # of visits: 12  Authorization/MCR certification date range:  10/9/2023 to 12/30/2023    Therapy Diagnoses:   1. Hip strain, left, initial encounter        2. Strain of hip and thigh, left, sequela        3. Left hip pain        4. Pain in joint, pelvic region and thigh          General:  Reason for visit: Left hip pain   Referred by: Dr. Lorenzo Pepper MD appt:  11/10/2023  Preferred Name:  Omar  Script:  Evaluate and treat  Onset Date:  8/11/2023    Assessment:  Patient is progressing with strength, flexibility, and decreased pain complaints.  Pt is limited still, per subjective report, with more demanding activities due to pain. Patient needs continued work on/skilled PT for progressing to a combination of land and aquatics for more functional challenge to address remaining functional, objective and subjective deficits to allow them to return to prior /optimal level of function with ADLs and job duties.  Patient is progressing with goals: yes  Skilled care:  Recheck and supervised aquatics    STG:  In two weeks.   Increased postural awareness: Goal Met    Compliant with HEP: Goal Met    LTG: by discharge    Increased postural awareness and posture WFL.  Partially Met    Increased function with ADL's and IADL's.  Improve LEFS to: 20% limitation of function.  Goal Met  Update on 12/28/2023:  Improve LEFS to 10% limitation of function    Normal reciprocal pattern on stairs for improved function to all levels of home.  Partially met    Increase  R SLS to 20\" Goal Met    Increase L strength to WNL for improved ease of work duties, especially when carrying a ladder. Goal Partially " "Met    Increase sky LE flexibility to WFL.  Partially Met    I and compliant with HEP and proper L LE care.   Partially Met    Plan:    Recommend additional visits for continuing PT and progressing to combo of land and aquatics program    Subjective:   Patient reports:  He tries to limit being on a ladder, but has days where he has no choice.  He notes that the last snowfall he tried to shovel heavy wet snow and didn't get through 1/4 of it without stopping and grabbing the  due to pain. Some of his jobs as a  require tasks that could be very strenuous but supervisor is working with him to avoid those types of tasks as much as possible.    Have you fallen since last visit:  no    Precautions: no fall risk    Pain:  1-5/10  Location/Type of pain:  Left groin and buttock but not in the bone. Dull at times, muscle tightness, sharp at times. \"Mini muscle pull\" if he pushes himself    HEP compliance/understanding:  Yes    Objective:   Outcome Measures:  Other Measures  Lower Extremity Funtional Score (LEFS): 50/80 initially to 61/80 currently    Posture:  decreased WB LE,  LE ER at hip.      Gait/stairs:  decreased stance time, decreased hip ext and trunk rotation, LE ER throughout cycle and step to pattern on stairs with B UE support with LE WB.      Balance:   L SLS:  >30 sec    ROM:    A/PROM with pain in left IR/ER, otherwise WFL  Seated AROM hip ER 30 sky; IR R 20 and L 15  MMT:   Hip flexors: Right 5/5; Left 4/5 initially to 4+/5  ext: Sky 4+/5  abd: Right 5/5; Left 3+/5 initially to 4/5 currently  add: Right 5/5; Left 4-/5 initially to 4+/5  Quads: Bilaterally 5/5  Hams: Bilaterally 5/5    Flexibility:    Hams:  SLR: Right 50 initially to 70, Left 40 initially to 60 currently  Hip flexors:  WFL    Palpation:  NT    Treatment:   **= HEP  NV= Next visit  np= not performed  nb= non-billable  G= group HEP= discharged to [facility]    Therapeutic Exercise:     20 minutes  Objective measurements and testing " completed  Discussed how well patient is complying with HEP and patient noting understanding of home program.    Aquatics:          25 Minutes  TM= Treadmill, T= Treadmill speed, C=Current, BTW = Back to Wall, NV = Next Visit, NP = Not Performed, G = Group, NB = non-billable     Stair Stretches: BLE hamstrings/hip flexors/calves   C=15 TM=13  5 min forward  C=15=T= 9 TM= backward   5 min   C=15 Side stepping with closed aqua ciser at hip 6 laps ea. way  Walking lunges with closed aqua ciser x 3 laps  C= 15  march:   with ankle wts (puple)   BTW  can can  10 x  BTW ham kicks 10 x   BTW piriformis stretch 20sec 3x each  NV  C=15  hip circles cw/ccw  15 x each B hands on hips  C=15   squats facing with foam under each foot 15x2   C=15 ham curls 15x 2 /np  C=15 3 way hip AROM  x 10 each bar bell row also   Yoga tree -> Modified Warrior 3 -> Warrior 2 -> Warrior 1 5 breaths x 1 each denisa/np  Deep end blue noodle  Cycle 3 min  Scissor/abd 3min each     Step ups forward/lateral   second step 15x nv  Step stretch ham/hip flexion 20sec 2x   nv    Therapeutic Activity:      minutes  Briefly discussed goal achievement and continued goals that can be achieved

## 2025-05-06 ENCOUNTER — TELEPHONE (OUTPATIENT)
Dept: PRIMARY CARE | Facility: CLINIC | Age: 50
End: 2025-05-06
Payer: COMMERCIAL

## 2025-05-23 ENCOUNTER — APPOINTMENT (OUTPATIENT)
Dept: PRIMARY CARE | Facility: CLINIC | Age: 50
End: 2025-05-23
Payer: COMMERCIAL

## 2025-05-23 VITALS
BODY MASS INDEX: 29.82 KG/M2 | OXYGEN SATURATION: 99 % | TEMPERATURE: 97.3 F | SYSTOLIC BLOOD PRESSURE: 130 MMHG | DIASTOLIC BLOOD PRESSURE: 82 MMHG | WEIGHT: 190 LBS | HEART RATE: 62 BPM | HEIGHT: 67 IN

## 2025-05-23 DIAGNOSIS — M16.12 PRIMARY OSTEOARTHRITIS OF LEFT HIP: ICD-10-CM

## 2025-05-23 DIAGNOSIS — Z00.00 ROUTINE GENERAL MEDICAL EXAMINATION AT A HEALTH CARE FACILITY: Primary | ICD-10-CM

## 2025-05-23 DIAGNOSIS — Z12.11 ENCOUNTER FOR SCREENING FOR MALIGNANT NEOPLASM OF COLON: ICD-10-CM

## 2025-05-23 PROCEDURE — 99386 PREV VISIT NEW AGE 40-64: CPT | Performed by: FAMILY MEDICINE

## 2025-05-23 PROCEDURE — 1036F TOBACCO NON-USER: CPT | Performed by: FAMILY MEDICINE

## 2025-05-23 PROCEDURE — 3008F BODY MASS INDEX DOCD: CPT | Performed by: FAMILY MEDICINE

## 2025-05-23 RX ORDER — OMEGA-3-ACID ETHYL ESTERS 1 G/1
1 CAPSULE, LIQUID FILLED ORAL 2 TIMES DAILY
COMMUNITY

## 2025-05-23 ASSESSMENT — PAIN SCALES - GENERAL: PAINLEVEL_OUTOF10: 4

## 2025-05-23 ASSESSMENT — PATIENT HEALTH QUESTIONNAIRE - PHQ9
2. FEELING DOWN, DEPRESSED OR HOPELESS: NOT AT ALL
SUM OF ALL RESPONSES TO PHQ9 QUESTIONS 1 AND 2: 0
1. LITTLE INTEREST OR PLEASURE IN DOING THINGS: NOT AT ALL

## 2025-05-23 ASSESSMENT — ENCOUNTER SYMPTOMS: DEPRESSION: 0

## 2025-05-23 NOTE — PROGRESS NOTES
Subjective   Patient ID: Omar Paredes is a 49 y.o. male who presents for [facility] Care (Left hip pain./) and Annual Exam (Patient is not fasting.).  HPI  49-year-old male presents to [facility] with a new physician.  Complete physical exam.  He is due for colon cancer screening.  He is due for fasting blood work.  Questions regarding left hip osteoarthritis.  Review of Systems  Constitutional: no chills, no fever and no night sweats.   Eyes: no blurred vision and no eyesight problems.   ENT: no hearing loss, no nasal congestion, no nasal discharge, no hoarseness and no sore throat.   Cardiovascular: no chest pain, no intermittent leg claudication, no lower extremity edema, no palpitations and no syncope.   Respiratory: no cough, no shortness of breath during exertion, no shortness of breath at rest and no wheezing.   Gastrointestinal: no abdominal pain, no blood in stools, no constipation, no diarrhea, no melena, no nausea, no rectal pain and no vomiting.   Genitourinary: no dysuria, no change in urinary frequency, no urinary hesitancy and no feelings of urinary urgency.   Neurological: no difficulty walking, no headache, no limb weakness, no numbness and no tingling.   Psychiatric: no anxiety, no depression, no anhedonia and no substance use disorders.   Endocrine: no recent weight gain and no recent weight loss.   Hematologic/Lymphatic: no tendency for easy bruising and no swollen glands.  Visit Vitals  /82   Pulse 62   Temp 36.3 °C (97.3 °F) (Temporal)        Objective   Physical Exam  Constitutional: Alert and in no acute distress. Well developed, well nourished.   Eyes: Pupils were equal in size, round, reactive to light (PERRL) with normal accommodation and extraocular movements intact (EOMI). Ophthalmoscopic examination: Normal.   Ears, Nose, Mouth, and Throat: External inspection of ears and nose: Normal. Otoscopic examination: Normal. Lips, teeth, and gums: Normal. Oropharynx: Normal.   Neck: No  neck mass was observed. Supple. Thyroid not enlarged and there were no palpable thyroid nodules.   Cardiovascular: Heart rate and rhythm were normal, normal S1 and S2, no gallops, no murmurs and no pericardial rub. Carotid pulses: Normal with no bruits. Abdominal aorta: Normal. Pedal pulses: Normal. No peripheral edema.   Pulmonary: No respiratory distress. Clear bilateral breath sounds.   Abdomen: Soft nontender; no abdominal mass palpated. Normal bowel sounds. No organomegaly.   Skin: Normal skin color and pigmentation, normal skin turgor, and no rash.   Psychiatric: Judgment and insight: Intact. Mood and affect: Normal.  Assessment/Plan   Problem List Items Addressed This Visit           ICD-10-CM    Routine general medical examination at a health care facility - Primary Z00.00    Relevant Orders    Lipid Panel    Comprehensive Metabolic Panel    CBC and Auto Differential    Urinalysis with Reflex Microscopic    Encounter for screening for malignant neoplasm of colon Z12.11    Relevant Orders    Colonoscopy Screening; Average Risk Patient    Primary osteoarthritis of left hip M16.12     #1.  Stable physical exam.  2.  We discussed stretching exercises and Pilates type exercises as well as massage therapy to help with the left hip osteoarthritis.  Monitor symptoms.

## 2025-06-14 LAB
ALBUMIN SERPL-MCNC: 4.6 G/DL (ref 3.6–5.1)
ALP SERPL-CCNC: 43 U/L (ref 36–130)
ALT SERPL-CCNC: 70 U/L (ref 9–46)
ANION GAP SERPL CALCULATED.4IONS-SCNC: 5 MMOL/L (CALC) (ref 7–17)
APPEARANCE UR: CLEAR
AST SERPL-CCNC: 28 U/L (ref 10–40)
BACTERIA #/AREA URNS HPF: ABNORMAL /HPF
BASOPHILS # BLD AUTO: 31 CELLS/UL (ref 0–200)
BASOPHILS NFR BLD AUTO: 0.7 %
BILIRUB SERPL-MCNC: 0.7 MG/DL (ref 0.2–1.2)
BILIRUB UR QL STRIP: NEGATIVE
BUN SERPL-MCNC: 16 MG/DL (ref 7–25)
CALCIUM SERPL-MCNC: 9.1 MG/DL (ref 8.6–10.3)
CHLORIDE SERPL-SCNC: 103 MMOL/L (ref 98–110)
CHOLEST SERPL-MCNC: 282 MG/DL
CHOLEST/HDLC SERPL: 4.7 (CALC)
CO2 SERPL-SCNC: 30 MMOL/L (ref 20–32)
COLOR UR: ABNORMAL
CREAT SERPL-MCNC: 0.91 MG/DL (ref 0.6–1.29)
EGFRCR SERPLBLD CKD-EPI 2021: 105 ML/MIN/1.73M2
EOSINOPHIL # BLD AUTO: 88 CELLS/UL (ref 15–500)
EOSINOPHIL NFR BLD AUTO: 2 %
ERYTHROCYTE [DISTWIDTH] IN BLOOD BY AUTOMATED COUNT: 12.6 % (ref 11–15)
GLUCOSE SERPL-MCNC: 97 MG/DL (ref 65–99)
GLUCOSE UR QL STRIP: NEGATIVE
HCT VFR BLD AUTO: 45.1 % (ref 38.5–50)
HDLC SERPL-MCNC: 60 MG/DL
HGB BLD-MCNC: 14.9 G/DL (ref 13.2–17.1)
HGB UR QL STRIP: NEGATIVE
HYALINE CASTS #/AREA URNS LPF: ABNORMAL /LPF
KETONES UR QL STRIP: ABNORMAL
LDLC SERPL CALC-MCNC: 197 MG/DL (CALC)
LEUKOCYTE ESTERASE UR QL STRIP: NEGATIVE
LYMPHOCYTES # BLD AUTO: 1298 CELLS/UL (ref 850–3900)
LYMPHOCYTES NFR BLD AUTO: 29.5 %
MCH RBC QN AUTO: 31.8 PG (ref 27–33)
MCHC RBC AUTO-ENTMCNC: 33 G/DL (ref 32–36)
MCV RBC AUTO: 96.2 FL (ref 80–100)
MONOCYTES # BLD AUTO: 616 CELLS/UL (ref 200–950)
MONOCYTES NFR BLD AUTO: 14 %
NEUTROPHILS # BLD AUTO: 2367 CELLS/UL (ref 1500–7800)
NEUTROPHILS NFR BLD AUTO: 53.8 %
NITRITE UR QL STRIP: NEGATIVE
NONHDLC SERPL-MCNC: 222 MG/DL (CALC)
PH UR STRIP: 5.5 [PH] (ref 5–8)
PLATELET # BLD AUTO: 227 THOUSAND/UL (ref 140–400)
PMV BLD REES-ECKER: 9.2 FL (ref 7.5–12.5)
POTASSIUM SERPL-SCNC: 4.5 MMOL/L (ref 3.5–5.3)
PROT SERPL-MCNC: 6.7 G/DL (ref 6.1–8.1)
PROT UR QL STRIP: ABNORMAL
RBC # BLD AUTO: 4.69 MILLION/UL (ref 4.2–5.8)
RBC #/AREA URNS HPF: ABNORMAL /HPF
SERVICE CMNT-IMP: ABNORMAL
SODIUM SERPL-SCNC: 138 MMOL/L (ref 135–146)
SP GR UR STRIP: 1.03 (ref 1–1.03)
SQUAMOUS #/AREA URNS HPF: ABNORMAL /HPF
TRIGL SERPL-MCNC: 111 MG/DL
WBC # BLD AUTO: 4.4 THOUSAND/UL (ref 3.8–10.8)
WBC #/AREA URNS HPF: ABNORMAL /HPF

## 2025-06-17 DIAGNOSIS — Z12.11 COLON CANCER SCREENING: Primary | ICD-10-CM

## 2025-06-17 DIAGNOSIS — E78.01 FAMILIAL HYPERCHOLESTEROLEMIA: Primary | ICD-10-CM

## 2025-06-18 RX ORDER — SODIUM, POTASSIUM,MAG SULFATES 17.5-3.13G
SOLUTION, RECONSTITUTED, ORAL ORAL
Qty: 354 ML | Refills: 0 | Status: SHIPPED | OUTPATIENT
Start: 2025-06-18

## 2025-07-10 ENCOUNTER — ANESTHESIA EVENT (OUTPATIENT)
Dept: GASTROENTEROLOGY | Facility: EXTERNAL LOCATION | Age: 50
End: 2025-07-10

## 2025-07-14 ENCOUNTER — PHARMACY VISIT (OUTPATIENT)
Dept: PHARMACY | Facility: CLINIC | Age: 50
End: 2025-07-14
Payer: COMMERCIAL

## 2025-07-14 PROCEDURE — RXMED WILLOW AMBULATORY MEDICATION CHARGE

## 2025-07-18 ENCOUNTER — APPOINTMENT (OUTPATIENT)
Dept: GASTROENTEROLOGY | Facility: EXTERNAL LOCATION | Age: 50
End: 2025-07-18
Payer: COMMERCIAL

## 2025-07-18 ENCOUNTER — ANESTHESIA (OUTPATIENT)
Dept: GASTROENTEROLOGY | Facility: EXTERNAL LOCATION | Age: 50
End: 2025-07-18

## 2025-07-18 VITALS
DIASTOLIC BLOOD PRESSURE: 84 MMHG | SYSTOLIC BLOOD PRESSURE: 138 MMHG | OXYGEN SATURATION: 99 % | TEMPERATURE: 98.1 F | BODY MASS INDEX: 29.82 KG/M2 | HEIGHT: 67 IN | WEIGHT: 190 LBS | RESPIRATION RATE: 13 BRPM | HEART RATE: 49 BPM

## 2025-07-18 DIAGNOSIS — Z12.11 ENCOUNTER FOR SCREENING FOR MALIGNANT NEOPLASM OF COLON: Primary | ICD-10-CM

## 2025-07-18 PROCEDURE — 45385 COLONOSCOPY W/LESION REMOVAL: CPT | Performed by: INTERNAL MEDICINE

## 2025-07-18 RX ORDER — PROPOFOL 10 MG/ML
INJECTION, EMULSION INTRAVENOUS AS NEEDED
Status: DISCONTINUED | OUTPATIENT
Start: 2025-07-18 | End: 2025-07-18

## 2025-07-18 RX ORDER — LIDOCAINE HYDROCHLORIDE 20 MG/ML
INJECTION, SOLUTION INFILTRATION; PERINEURAL AS NEEDED
Status: DISCONTINUED | OUTPATIENT
Start: 2025-07-18 | End: 2025-07-18

## 2025-07-18 RX ORDER — SODIUM CHLORIDE 9 MG/ML
INJECTION, SOLUTION INTRAVENOUS CONTINUOUS PRN
Status: DISCONTINUED | OUTPATIENT
Start: 2025-07-18 | End: 2025-07-18

## 2025-07-18 RX ADMIN — LIDOCAINE HYDROCHLORIDE 3 ML: 20 INJECTION, SOLUTION INFILTRATION; PERINEURAL at 09:43

## 2025-07-18 RX ADMIN — PROPOFOL 100 MG: 10 INJECTION, EMULSION INTRAVENOUS at 09:43

## 2025-07-18 RX ADMIN — PROPOFOL 150 MG: 10 INJECTION, EMULSION INTRAVENOUS at 09:54

## 2025-07-18 RX ADMIN — SODIUM CHLORIDE: 9 INJECTION, SOLUTION INTRAVENOUS at 09:43

## 2025-07-18 RX ADMIN — PROPOFOL 100 MG: 10 INJECTION, EMULSION INTRAVENOUS at 09:48

## 2025-07-18 SDOH — HEALTH STABILITY: MENTAL HEALTH: CURRENT SMOKER: 0

## 2025-07-18 ASSESSMENT — ENCOUNTER SYMPTOMS
CARDIOVASCULAR NEGATIVE: 1
CONSTITUTIONAL NEGATIVE: 1
ENDOCRINE NEGATIVE: 1
RESPIRATORY NEGATIVE: 1
NEUROLOGICAL NEGATIVE: 1

## 2025-07-18 ASSESSMENT — PAIN - FUNCTIONAL ASSESSMENT
PAIN_FUNCTIONAL_ASSESSMENT: 0-10

## 2025-07-18 ASSESSMENT — PAIN SCALES - GENERAL
PAINLEVEL_OUTOF10: 0 - NO PAIN

## 2025-07-18 ASSESSMENT — COLUMBIA-SUICIDE SEVERITY RATING SCALE - C-SSRS
2. HAVE YOU ACTUALLY HAD ANY THOUGHTS OF KILLING YOURSELF?: NO
1. IN THE PAST MONTH, HAVE YOU WISHED YOU WERE DEAD OR WISHED YOU COULD GO TO SLEEP AND NOT WAKE UP?: NO
6. HAVE YOU EVER DONE ANYTHING, STARTED TO DO ANYTHING, OR PREPARED TO DO ANYTHING TO END YOUR LIFE?: NO

## 2025-07-18 NOTE — H&P
"History Of Present Illness  Omar Paredes is a 49 y.o. male presenting with colon cancer screening     Past Medical History  Medical History[1]  Surgical History  Surgical History[2]  Social History  He reports that he has never smoked. He has never used smokeless tobacco. He reports current alcohol use of about 8.0 standard drinks of alcohol per week. He reports that he does not currently use drugs.    Family History  Family History[3]     Allergies  Allergies[4]  Review of Systems   Constitutional: Negative.    Respiratory: Negative.     Cardiovascular: Negative.    Endocrine: Negative.    Genitourinary: Negative.    Skin: Negative.    Neurological: Negative.         Physical Exam  Constitutional:       Appearance: Normal appearance.   HENT:      Head: Normocephalic and atraumatic.     Cardiovascular:      Rate and Rhythm: Normal rate and regular rhythm.   Pulmonary:      Effort: Pulmonary effort is normal.      Breath sounds: Normal breath sounds.   Abdominal:      General: Abdomen is flat. Bowel sounds are normal.      Palpations: Abdomen is soft.      Tenderness: There is no abdominal tenderness.     Musculoskeletal:         General: Normal range of motion.     Skin:     General: Skin is warm.     Neurological:      General: No focal deficit present.      Mental Status: He is alert.          Last Recorded Vitals  Blood pressure (!) 148/92, pulse 59, temperature 36.6 °C (97.9 °F), temperature source Temporal, resp. rate 18, height 1.702 m (5' 7\"), weight 86.2 kg (190 lb), SpO2 98%.    Assessment/Plan   Colon cancer screening  Colonoscopy      Ludwig Padilla MD       [1]   Past Medical History:  Diagnosis Date    Blister (nonthermal) of right little finger, initial encounter 09/23/2023    Other specified health status     No pertinent past medical history   [2]   Past Surgical History:  Procedure Laterality Date    CHOLECYSTECTOMY  2006    OTHER SURGICAL HISTORY  04/12/2021    Cholecystectomy    OTHER SURGICAL " HISTORY  04/12/2021    Foot surgery   [3] No family history on file.  [4]   Allergies  Allergen Reactions    Codeine Sulfate Unknown

## 2025-07-18 NOTE — ANESTHESIA PREPROCEDURE EVALUATION
Patient: Omar Paredes    Procedure Information       Date/Time: 07/18/25 0930    Scheduled providers: Ludwig Padilla MD    Procedure: COLONOSCOPY    Location: [facility]            Relevant Problems   Musculoskeletal   (+) Primary osteoarthritis of left hip       Clinical information reviewed:   Tobacco  Allergies  Meds   Med Hx  Surg Hx   Fam Hx  Soc Hx        NPO Detail:  NPO/Void Status  Date of Last Liquid: 07/18/25  Time of Last Liquid: 0500  Date of Last Solid: 07/16/25  Time of Last Solid: 0800         Physical Exam    Airway  Mallampati: I  TM distance: >3 FB  Neck ROM: full  Mouth opening: 3 or more finger widths     Cardiovascular - normal exam   Dental - normal exam     Pulmonary - normal exam   Abdominal - normal exam           Anesthesia Plan    History of general anesthesia?: yes  History of complications of general anesthesia?: no    ASA 2     MAC     The patient is not a current smoker.  Patient was not previously instructed to abstain from smoking on day of procedure.  Patient did not smoke on day of procedure.    intravenous induction   Anesthetic plan and risks discussed with patient.

## 2025-07-18 NOTE — DISCHARGE INSTRUCTIONS

## 2025-07-18 NOTE — ANESTHESIA POSTPROCEDURE EVALUATION
Patient: Omar Paredes    Procedure Summary       Date: 07/18/25 Room / Location: [facility]    Anesthesia Start: 0937 Anesthesia Stop: 1002    Procedure: COLONOSCOPY Diagnosis:       Encounter for screening for malignant neoplasm of colon      Encounter for screening for malignant neoplasm of colon    Scheduled Providers: Ludwig Padilla MD Responsible Provider: JC Muñoz    Anesthesia Type: MAC ASA Status: 2            Anesthesia Type: MAC    Vitals Value Taken Time   /72 07/18/25 10:02   Temp 36.7 °C (98.1 °F) 07/18/25 10:02   Pulse 70 07/18/25 10:02   Resp 10 07/18/25 10:02   SpO2 99 % 07/18/25 10:02       Anesthesia Post Evaluation    Patient participation: complete - patient participated  Level of consciousness: sleepy but conscious  Pain management: adequate  Airway patency: patent  Cardiovascular status: acceptable  Respiratory status: acceptable  Hydration status: acceptable  Postoperative Nausea and Vomiting: none        No notable events documented.

## 2025-07-24 LAB
LABORATORY COMMENT REPORT: NORMAL
PATH REPORT.FINAL DX SPEC: NORMAL
PATH REPORT.GROSS SPEC: NORMAL
PATH REPORT.TOTAL CANCER: NORMAL

## 2025-08-26 DIAGNOSIS — M71.349 SYNOVIAL CYST OF HAND: Primary | ICD-10-CM

## 2025-09-08 ENCOUNTER — APPOINTMENT (OUTPATIENT)
Dept: ORTHOPEDIC SURGERY | Facility: CLINIC | Age: 50
End: 2025-09-08
Payer: COMMERCIAL